# Patient Record
Sex: FEMALE | Race: WHITE | NOT HISPANIC OR LATINO | URBAN - METROPOLITAN AREA
[De-identification: names, ages, dates, MRNs, and addresses within clinical notes are randomized per-mention and may not be internally consistent; named-entity substitution may affect disease eponyms.]

---

## 2019-03-07 ENCOUNTER — HOSPITAL ENCOUNTER (EMERGENCY)
Facility: MEDICAL CENTER | Age: 19
End: 2019-03-07
Attending: EMERGENCY MEDICINE
Payer: COMMERCIAL

## 2019-03-07 VITALS
SYSTOLIC BLOOD PRESSURE: 126 MMHG | HEIGHT: 63 IN | HEART RATE: 100 BPM | RESPIRATION RATE: 16 BRPM | DIASTOLIC BLOOD PRESSURE: 87 MMHG | TEMPERATURE: 97.7 F | OXYGEN SATURATION: 100 % | WEIGHT: 109.79 LBS | BODY MASS INDEX: 19.45 KG/M2

## 2019-03-07 DIAGNOSIS — M75.21 BICEPS TENDINITIS OF RIGHT UPPER EXTREMITY: ICD-10-CM

## 2019-03-07 PROCEDURE — 99283 EMERGENCY DEPT VISIT LOW MDM: CPT

## 2019-03-07 RX ORDER — METHYLPREDNISOLONE 4 MG/1
TABLET ORAL
Qty: 21 TAB | Refills: 0 | Status: SHIPPED | OUTPATIENT
Start: 2019-03-07

## 2019-03-07 ASSESSMENT — LIFESTYLE VARIABLES: DO YOU DRINK ALCOHOL: NO

## 2019-03-07 NOTE — ED NOTES
Pt ambulated to yellow [facility]H, here for right forearm pain with limited rom, pain armpit when she raise her arm.cms+

## 2019-03-07 NOTE — ED NOTES
Patient verbalized understanding to discharge paperwork and prescriptions. Patient received sling to right upper extremity. Patient respirations even and non labored. Gait even and steady. No acute distress noted.

## 2019-03-07 NOTE — ED PROVIDER NOTES
"ED Provider Note    Scribed for Uche Sanchez M.D. by Daysi Gay. 3/7/2019  9:16 AM    Primary care provider: Sanket Rhodes M.D.  Means of arrival: walk-in  History obtained from: patient  History limited by: none    CHIEF COMPLAINT  Chief Complaint   Patient presents with   • Arm Pain     no known trauma        HPI  Gato Marcano is a 18 y.o. female who presents to the Emergency Department for evaluation of right forearm pain that radiates up into her bicep onset around 10AM yesterday with associated decreased range of motion secondary to pain and exacerbation of the pain with flexion of the right elbow. Patient denies any recent falls or traumas, but she is a , and uses her hands a lot throughout the day, first noticing the pain while she was holding up a child. No complaints of motor or sensory changes.    REVIEW OF SYSTEMS  Pertinent positives include right forearm.   Pertinent negatives include no motor or sensory changes.    See HPI for further details.     PAST MEDICAL HISTORY   no history of orthopedic injury along the right arm.    SURGICAL HISTORY  patient denies any surgical history    SOCIAL HISTORY  Social History   Substance Use Topics   • Smoking status: Never Smoker   • Smokeless tobacco: Never Used   • Alcohol use No      History   Drug Use No       FAMILY HISTORY  History reviewed. No pertinent family history.    CURRENT MEDICATIONS  Home Medications     Reviewed by Louise Jarvis R.N. (Registered Nurse) on 03/07/19 at 0840  Med List Status: Not Addressed   Medication Last Dose Status        Patient Alok Taking any Medications                       ALLERGIES  No Known Allergies    PHYSICAL EXAM  VITAL SIGNS: /87   Pulse 100   Temp 36.5 °C (97.7 °F) (Temporal)   Resp 16   Ht 1.6 m (5' 3\")   Wt 49.8 kg (109 lb 12.6 oz)   LMP 03/07/2019 (Exact Date)   SpO2 100%   BMI 19.45 kg/m²     Nursing note and vitals reviewed.  Constitutional: Well-developed and " well-nourished. No distress.   Musculoskeletal: tenderness over the distal biceps tendon, along the insertion on the forearm.  Neurological: Awake, alert and oriented to person, place, and time. No focal deficits noted.  Skin: Skin is warm and dry. No rash.  No significant erythema.  Psychiatric: Normal mood and affect. Appropriate for clinical situation.    COURSE & MEDICAL DECISION MAKING  Nursing notes, VS, PMSFHx reviewed in chart.     9:16 AM - Patient seen and examined at bedside. She presents today for evaluation of right forearm pain, along the bicep tendon insertion point. Patient is a  and lifts children regularly. With this history and her exam, I informed the patient that she is most likely experiencing bicep tendonitis. I explained that I would place her on a steroid to help reduce the inflammation, as well as in a sling to immobilize the arm while it rests. Patient understands and agrees with treatment plan and discharge.     The patient will return for new or worsening symptoms and is stable at the time of discharge.    DISPOSITION:  Patient will be discharged home in stable condition.    FOLLOW UP:  Sanket Rhodes M.D.    Schedule an appointment as soon as possible for a visit       [facility], Emergency Dept  [address] [place] [id number] [phone]    If symptoms worsen      OUTPATIENT MEDICATIONS:  New Prescriptions    METHYLPREDNISOLONE (MEDROL) 4 MG TAB    Take as per the package instructions.         FINAL IMPRESSION  1. Biceps tendinitis of right upper extremity          Daysi BALL (Scribe), am scribing for, and in the presence of, Uche Sanchez M.D..    Electronically signed by: Daysi Gay (Kennedyibe), 3/7/2019    Uche BALL M.D. personally performed the services described in this documentation, as scribed by Daysi Gay in my presence, and it is both accurate and complete.    The note accurately reflects work and  decisions made by me.  Uche Sanchez  3/7/2019  11:08 AM

## 2021-06-22 ENCOUNTER — HOSPITAL ENCOUNTER (OUTPATIENT)
Dept: HOSPITAL 8 - CFH | Age: 21
Discharge: HOME | End: 2021-06-22
Attending: FAMILY MEDICINE
Payer: COMMERCIAL

## 2021-06-22 DIAGNOSIS — R10.9: ICD-10-CM

## 2021-06-22 DIAGNOSIS — R10.11: Primary | ICD-10-CM

## 2021-06-22 PROCEDURE — 76700 US EXAM ABDOM COMPLETE: CPT

## 2021-06-30 ENCOUNTER — HOSPITAL ENCOUNTER (INPATIENT)
Dept: HOSPITAL 8 - ED | Age: 21
LOS: 2 days | Discharge: HOME | DRG: 373 | End: 2021-07-02
Attending: HOSPITALIST | Admitting: STUDENT IN AN ORGANIZED HEALTH CARE EDUCATION/TRAINING PROGRAM
Payer: COMMERCIAL

## 2021-06-30 VITALS — DIASTOLIC BLOOD PRESSURE: 63 MMHG | SYSTOLIC BLOOD PRESSURE: 101 MMHG

## 2021-06-30 VITALS — BODY MASS INDEX: 20.82 KG/M2 | HEIGHT: 63 IN | WEIGHT: 117.51 LBS

## 2021-06-30 VITALS — DIASTOLIC BLOOD PRESSURE: 68 MMHG | SYSTOLIC BLOOD PRESSURE: 108 MMHG

## 2021-06-30 DIAGNOSIS — A04.72: Primary | ICD-10-CM

## 2021-06-30 DIAGNOSIS — K59.00: ICD-10-CM

## 2021-06-30 DIAGNOSIS — F12.90: ICD-10-CM

## 2021-06-30 DIAGNOSIS — T36.1X5A: ICD-10-CM

## 2021-06-30 DIAGNOSIS — F41.1: ICD-10-CM

## 2021-06-30 DIAGNOSIS — Z20.822: ICD-10-CM

## 2021-06-30 LAB
<PLATELET ESTIMATE>: ADEQUATE
<PLT MORPHOLOGY>: (no result)
ALBUMIN SERPL-MCNC: 3.7 G/DL (ref 3.4–5)
ALP SERPL-CCNC: 69 U/L (ref 45–117)
ALT SERPL-CCNC: 21 U/L (ref 12–78)
ANION GAP SERPL CALC-SCNC: 8 MMOL/L (ref 5–15)
BAND#(MANUAL): 3.04 X10^3/UL
BASOPHILS # BLD AUTO: 0 X10^3/UL (ref 0–0.1)
BASOPHILS NFR BLD AUTO: 0 % (ref 0–1)
BILIRUB DIRECT SERPL-MCNC: NORMAL MG/DL
BILIRUB SERPL-MCNC: 0.6 MG/DL (ref 0.2–1)
CALCIUM SERPL-MCNC: 8.9 MG/DL (ref 8.5–10.1)
CHLORIDE SERPL-SCNC: 108 MMOL/L (ref 98–107)
CLOSTRIDIUM DIFFICILE ANTIGEN: POSITIVE
CLOSTRIDIUM DIFFICILE TOXIN: NEGATIVE
CREAT SERPL-MCNC: 0.94 MG/DL (ref 0.55–1.02)
EOSINOPHIL # BLD AUTO: 0 X10^3/UL (ref 0–0.4)
EOSINOPHIL NFR BLD AUTO: 0 % (ref 1–7)
ERYTHROCYTE [DISTWIDTH] IN BLOOD BY AUTOMATED COUNT: 13.1 % (ref 9.6–15.2)
ERYTHROCYTE [DISTWIDTH] IN BLOOD BY AUTOMATED COUNT: 13.1 % (ref 9.6–15.2)
ERYTHROCYTE [SEDIMENTATION RATE] IN BLOOD BY WESTERGREN METHOD: 20 MM/HR (ref 0–20)
HCT (SEDRATE): 37.6 % (ref 34.6–47.8)
LYMPH#(MANUAL): 0.59 X10^3/UL (ref 1–3.4)
LYMPHOCYTES # BLD AUTO: 1.5 X10^3/UL (ref 1–3.4)
LYMPHOCYTES NFR BLD AUTO: 19 % (ref 22–44)
LYMPHS% (MANUAL): 6 % (ref 22–44)
MCH RBC QN AUTO: 32 PG (ref 27–34.8)
MCH RBC QN AUTO: 32.2 PG (ref 27–34.8)
MCHC RBC AUTO-ENTMCNC: 34.7 G/DL (ref 32.4–35.8)
MCHC RBC AUTO-ENTMCNC: 34.8 G/DL (ref 32.4–35.8)
MICROSCOPIC: (no result)
MICROSCOPIC: (no result)
MONOCYTES # BLD AUTO: 0.7 X10^3/UL (ref 0.2–0.8)
MONOCYTES NFR BLD AUTO: 9 % (ref 2–9)
MONOS#(MANUAL): 0.2 X10^3/UL (ref 0.3–2.7)
MONOS% (MANUAL): 2 % (ref 2–9)
NEUTROPHILS # BLD AUTO: 5.8 X10^3/UL (ref 1.8–6.8)
NEUTROPHILS NFR BLD AUTO: 72 % (ref 42–75)
NEUTS BAND NFR BLD: 31 % (ref 0–7)
PLATELET # BLD AUTO: 210 X10^3/UL (ref 130–400)
PLATELET # BLD AUTO: 223 X10^3/UL (ref 130–400)
PMV BLD AUTO: 7.7 FL (ref 7.4–10.4)
PMV BLD AUTO: 8.2 FL (ref 7.4–10.4)
PROT SERPL-MCNC: 7.3 G/DL (ref 6.4–8.2)
RBC # BLD AUTO: 4.05 X10^6/UL (ref 3.82–5.3)
RBC # BLD AUTO: 4.61 X10^6/UL (ref 3.82–5.3)
SEG#(MANUAL): 5.98 X10^3/UL (ref 1.8–6.8)
SEGS% (MANUAL): 61 % (ref 42–75)

## 2021-06-30 PROCEDURE — 80053 COMPREHEN METABOLIC PANEL: CPT

## 2021-06-30 PROCEDURE — 84100 ASSAY OF PHOSPHORUS: CPT

## 2021-06-30 PROCEDURE — 81001 URINALYSIS AUTO W/SCOPE: CPT

## 2021-06-30 PROCEDURE — 87086 URINE CULTURE/COLONY COUNT: CPT

## 2021-06-30 PROCEDURE — 74021 RADEX ABDOMEN 3+ VIEWS: CPT

## 2021-06-30 PROCEDURE — 85651 RBC SED RATE NONAUTOMATED: CPT

## 2021-06-30 PROCEDURE — 99285 EMERGENCY DEPT VISIT HI MDM: CPT

## 2021-06-30 PROCEDURE — 87493 C DIFF AMPLIFIED PROBE: CPT

## 2021-06-30 PROCEDURE — 87328 CRYPTOSPORIDIUM AG IA: CPT

## 2021-06-30 PROCEDURE — 0T9B70Z DRAINAGE OF BLADDER WITH DRAINAGE DEVICE, VIA NATURAL OR ARTIFICIAL OPENING: ICD-10-PCS | Performed by: HOSPITALIST

## 2021-06-30 PROCEDURE — 36415 COLL VENOUS BLD VENIPUNCTURE: CPT

## 2021-06-30 PROCEDURE — U0003 INFECTIOUS AGENT DETECTION BY NUCLEIC ACID (DNA OR RNA); SEVERE ACUTE RESPIRATORY SYNDROME CORONAVIRUS 2 (SARS-COV-2) (CORONAVIRUS DISEASE [COVID-19]), AMPLIFIED PROBE TECHNIQUE, MAKING USE OF HIGH THROUGHPUT TECHNOLOGIES AS DESCRIBED BY CMS-2020-01-R: HCPCS

## 2021-06-30 PROCEDURE — 83993 ASSAY FOR CALPROTECTIN FECAL: CPT

## 2021-06-30 PROCEDURE — 96374 THER/PROPH/DIAG INJ IV PUSH: CPT

## 2021-06-30 PROCEDURE — 74177 CT ABD & PELVIS W/CONTRAST: CPT

## 2021-06-30 PROCEDURE — 87324 CLOSTRIDIUM AG IA: CPT

## 2021-06-30 PROCEDURE — 89055 LEUKOCYTE ASSESSMENT FECAL: CPT

## 2021-06-30 PROCEDURE — 84703 CHORIONIC GONADOTROPIN ASSAY: CPT

## 2021-06-30 PROCEDURE — 85025 COMPLETE CBC W/AUTO DIFF WBC: CPT

## 2021-06-30 PROCEDURE — 83690 ASSAY OF LIPASE: CPT

## 2021-06-30 PROCEDURE — 87077 CULTURE AEROBIC IDENTIFY: CPT

## 2021-06-30 PROCEDURE — 87329 GIARDIA AG IA: CPT

## 2021-06-30 PROCEDURE — 83735 ASSAY OF MAGNESIUM: CPT

## 2021-06-30 PROCEDURE — 96361 HYDRATE IV INFUSION ADD-ON: CPT

## 2021-06-30 RX ADMIN — SODIUM CHLORIDE, SODIUM LACTATE, POTASSIUM CHLORIDE, AND CALCIUM CHLORIDE SCH MLS/HR: .6; .31; .03; .02 INJECTION, SOLUTION INTRAVENOUS at 15:47

## 2021-06-30 RX ADMIN — TRAZODONE HYDROCHLORIDE PRN MG: 50 TABLET ORAL at 20:59

## 2021-06-30 NOTE — NUR
INTINAL CONTACT WITH PT:  PT W/ C/O DIFFUSE ABD PAIN WITH N/V/D FOR TWO WEEKS.  
US LAST WEEK HERE, PT STATES NORMAL. STEADY GAIT TO BATHROOM TO PROVIDE UA. 
POSTIONED TO COMFORT IN BED. ATTACHED TO MONITORS. VELASQUEZ CHASE. MOTHER AT 
BEDSIDE. DR. KEANE AT BEDSIDE FOR EVALUATION.

## 2021-06-30 NOTE — NUR
RECEIVED REPORT FROM ROSALIE. PT [facility] [place] [facility] AT BS, NAD, NO 
NEEDS AT THIS TIME, CALL LIGHT WITHIN REACH, ISO PREC PLACED WHILE AWAITING 
STOOL RESULTS.

## 2021-07-01 VITALS — SYSTOLIC BLOOD PRESSURE: 129 MMHG | DIASTOLIC BLOOD PRESSURE: 90 MMHG

## 2021-07-01 VITALS — SYSTOLIC BLOOD PRESSURE: 100 MMHG | DIASTOLIC BLOOD PRESSURE: 61 MMHG

## 2021-07-01 VITALS — SYSTOLIC BLOOD PRESSURE: 112 MMHG | DIASTOLIC BLOOD PRESSURE: 67 MMHG

## 2021-07-01 VITALS — DIASTOLIC BLOOD PRESSURE: 75 MMHG | SYSTOLIC BLOOD PRESSURE: 119 MMHG

## 2021-07-01 LAB
ALBUMIN SERPL-MCNC: 2.8 G/DL (ref 3.4–5)
ALP SERPL-CCNC: 47 U/L (ref 45–117)
ALT SERPL-CCNC: 15 U/L (ref 12–78)
ANION GAP SERPL CALC-SCNC: 10 MMOL/L (ref 5–15)
BILIRUB SERPL-MCNC: 0.4 MG/DL (ref 0.2–1)
CALCIUM SERPL-MCNC: 8.2 MG/DL (ref 8.5–10.1)
CHLORIDE SERPL-SCNC: 113 MMOL/L (ref 98–107)
CREAT SERPL-MCNC: 0.66 MG/DL (ref 0.55–1.02)
PROT SERPL-MCNC: 5.5 G/DL (ref 6.4–8.2)

## 2021-07-01 RX ADMIN — VANCOMYCIN HYDROCHLORIDE SCH MG: 1 INJECTION, POWDER, LYOPHILIZED, FOR SOLUTION INTRAVENOUS at 14:23

## 2021-07-01 RX ADMIN — SODIUM CHLORIDE, SODIUM LACTATE, POTASSIUM CHLORIDE, AND CALCIUM CHLORIDE SCH MLS/HR: .6; .31; .03; .02 INJECTION, SOLUTION INTRAVENOUS at 11:27

## 2021-07-01 RX ADMIN — SODIUM CHLORIDE, SODIUM LACTATE, POTASSIUM CHLORIDE, AND CALCIUM CHLORIDE SCH MLS/HR: .6; .31; .03; .02 INJECTION, SOLUTION INTRAVENOUS at 20:55

## 2021-07-01 RX ADMIN — SODIUM CHLORIDE, SODIUM LACTATE, POTASSIUM CHLORIDE, AND CALCIUM CHLORIDE SCH MLS/HR: .6; .31; .03; .02 INJECTION, SOLUTION INTRAVENOUS at 01:29

## 2021-07-01 RX ADMIN — TRAZODONE HYDROCHLORIDE PRN MG: 50 TABLET ORAL at 20:54

## 2021-07-01 RX ADMIN — VANCOMYCIN HYDROCHLORIDE SCH MG: 1 INJECTION, POWDER, LYOPHILIZED, FOR SOLUTION INTRAVENOUS at 19:13

## 2021-07-02 VITALS — DIASTOLIC BLOOD PRESSURE: 70 MMHG | SYSTOLIC BLOOD PRESSURE: 120 MMHG

## 2021-07-02 VITALS — SYSTOLIC BLOOD PRESSURE: 101 MMHG | DIASTOLIC BLOOD PRESSURE: 58 MMHG

## 2021-07-02 VITALS — DIASTOLIC BLOOD PRESSURE: 67 MMHG | SYSTOLIC BLOOD PRESSURE: 110 MMHG

## 2021-07-02 RX ADMIN — SODIUM CHLORIDE, SODIUM LACTATE, POTASSIUM CHLORIDE, AND CALCIUM CHLORIDE SCH MLS/HR: .6; .31; .03; .02 INJECTION, SOLUTION INTRAVENOUS at 07:35

## 2021-07-02 RX ADMIN — VANCOMYCIN HYDROCHLORIDE SCH MG: 1 INJECTION, POWDER, LYOPHILIZED, FOR SOLUTION INTRAVENOUS at 14:21

## 2021-07-02 RX ADMIN — VANCOMYCIN HYDROCHLORIDE SCH MG: 1 INJECTION, POWDER, LYOPHILIZED, FOR SOLUTION INTRAVENOUS at 01:22

## 2021-07-02 RX ADMIN — VANCOMYCIN HYDROCHLORIDE SCH MG: 1 INJECTION, POWDER, LYOPHILIZED, FOR SOLUTION INTRAVENOUS at 07:37

## 2025-02-10 NOTE — ED TRIAGE NOTES
"PT ambulated to triage c/o rt arm pain.  Pt denies trauma to the arm, reports that it began yesterday feeling like \"I had just worked out\" pt reports that the pain has increased over the night  Chief Complaint   Patient presents with   • Arm Pain     no known trauma      Blood pressure 126/87, pulse 100, temperature 36.5 °C (97.7 °F), temperature source Temporal, resp. rate 16, height 1.6 m (5' 3\"), weight 49.8 kg (109 lb 12.6 oz), last menstrual period 03/07/2019, SpO2 100 %.      " Medication: sildenafil  Medication refill denied due to duplicate request.       Medication: Atorvastatin   Medication refill denied due to duplicate request.